# Patient Record
Sex: FEMALE | URBAN - METROPOLITAN AREA
[De-identification: names, ages, dates, MRNs, and addresses within clinical notes are randomized per-mention and may not be internally consistent; named-entity substitution may affect disease eponyms.]

---

## 2019-10-25 ENCOUNTER — OFFICE VISIT (OUTPATIENT)
Dept: PLASTIC SURGERY | Facility: CLINIC | Age: 44
End: 2019-10-25

## 2019-10-25 DIAGNOSIS — Z41.1 ELECTIVE PROCEDURE FOR UNACCEPTABLE COSMETIC APPEARANCE: Primary | ICD-10-CM

## 2019-10-25 RX ORDER — LAMOTRIGINE 150 MG/1
150 TABLET ORAL DAILY
COMMUNITY

## 2019-10-25 NOTE — LETTER
Date:October 28, 2019      Patient was self referred, no letter generated. Do not send.        [facility] Information

## 2019-10-25 NOTE — NURSING NOTE
Photodocumentation obtained.    Pt. Give a cosmetic quote for Bilateral Lower Eyelid Blepharoplasty (see Media Tab).  Quote reviewed with pt and discussed the fact that the Facility fees are subject to change, given the fact that they're based on time.    Obtained written and verbal consent for Dermal Filler.  Discussed signs and symptoms of Intravascular occlusion and to call clinic immediately should there be any concern or questions.  Also discussed the other risk factors including but not limited to bruising, swelling, infection, soft tissue necrosis, blindness, pain, redness.    Prepped for procedure with [employer] Chlorhexidine topical solution to skin, applied topical Prilocaine and Lidocaine for pre-procedure numbing.  Used intermittent ice for comfort.  Patient tolerated procedure well.     Ermelinda Singer RN  10/25/2019 6:40 PM

## 2019-10-25 NOTE — PROGRESS NOTES
Facial Plastic and Reconstructive Surgery      Mary Muñoz comes in for lower eyelid management.     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    We discussed two management strategies, filler to the tear trough or lower eyelid surgery.    She elected to proceed with filler.      Procedure: Dermal Filler for Facial Contouring  Indication: Facial volume deficit  Injector: Tessie Perez MD      The risks and benefits associated with dermal filler were discussed. Informed consent was obtained specifically addressing risks including intravascular injection. The skin was cleaned with antimicrobial solution and a topical ice was placed. If the patient elected topical anesthetic solution, this was placed.     A needle was used to establish the entry point of the canula. The canula and needle were used to inject the filler with slow cautious consistent flow with careful attention to the danger zones of the face.      Intermittent ice was used topically throughout the procedure. Hemostasis was obtained at the needle entry site with light digital pressure. The skin was cleaned.    Filler Type: Restylane  Total Amount of Filler: 1 ml, diluted 1:1 with injectable saline    Please see procedure log.    There were no complications and the patient tolerated the procedure well.  Post procedure care instructions were given to the patient.

## 2019-10-25 NOTE — LETTER
October 25, 2019  Re: Mary Muñoz  1975    Dear Dr. Blandon,    Thank you so much for referring Mary Muñoz to the [facility]. I had the pleasure of visiting with Mary today.     Attached you will find a copy of my note. Please feel free to reach out to me with any questions, [phone].     Facial Plastic and Reconstructive Surgery      Mary Muñoz comes in for lower eyelid management.     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    We discussed two management strategies, filler to the tear trough or lower eyelid surgery.    She elected to proceed with filler.      Procedure: Dermal Filler for Facial Contouring  Indication: Facial volume deficit  Injector: Tessie Perez MD      The risks and benefits associated with dermal filler were discussed. Informed consent was obtained specifically addressing risks including intravascular injection. The skin was cleaned with antimicrobial solution and a topical ice was placed. If the patient elected topical anesthetic solution, this was placed.     A needle was used to establish the entry point of the canula. The canula and needle were used to inject the filler with slow cautious consistent flow with careful attention to the danger zones of the face.      Intermittent ice was used topically throughout the procedure. Hemostasis was obtained at the needle entry site with light digital pressure. The skin was cleaned.    Filler Type: Restylane  Total Amount of Filler: 1 ml, diluted 1:1 with injectable saline    Please see procedure log.    There were no complications and the patient tolerated the procedure well.  Post procedure care instructions were given to the patient.        Your trust in our practice and care is much appreciated.    Sincerely,  Tessie Perez MD